# Patient Record
Sex: FEMALE | Race: WHITE
[De-identification: names, ages, dates, MRNs, and addresses within clinical notes are randomized per-mention and may not be internally consistent; named-entity substitution may affect disease eponyms.]

---

## 2018-08-02 ENCOUNTER — HOSPITAL ENCOUNTER (OUTPATIENT)
Dept: HOSPITAL 96 - M.RAD | Age: 73
End: 2018-08-02
Attending: INTERNAL MEDICINE
Payer: COMMERCIAL

## 2018-08-02 DIAGNOSIS — E87.6: ICD-10-CM

## 2018-08-02 DIAGNOSIS — N91.2: ICD-10-CM

## 2018-08-02 DIAGNOSIS — Z78.0: ICD-10-CM

## 2018-08-02 DIAGNOSIS — Z12.31: Primary | ICD-10-CM

## 2018-09-26 ENCOUNTER — HOSPITAL ENCOUNTER (INPATIENT)
Dept: HOSPITAL 96 - M.ERS | Age: 73
LOS: 2 days | Discharge: HOME | DRG: 682 | End: 2018-09-28
Attending: INTERNAL MEDICINE | Admitting: INTERNAL MEDICINE
Payer: COMMERCIAL

## 2018-09-26 VITALS — WEIGHT: 74 LBS | HEIGHT: 64.02 IN | BODY MASS INDEX: 12.64 KG/M2

## 2018-09-26 VITALS — DIASTOLIC BLOOD PRESSURE: 66 MMHG | SYSTOLIC BLOOD PRESSURE: 115 MMHG

## 2018-09-26 VITALS — SYSTOLIC BLOOD PRESSURE: 118 MMHG | DIASTOLIC BLOOD PRESSURE: 72 MMHG

## 2018-09-26 VITALS — DIASTOLIC BLOOD PRESSURE: 72 MMHG | SYSTOLIC BLOOD PRESSURE: 118 MMHG

## 2018-09-26 VITALS — DIASTOLIC BLOOD PRESSURE: 47 MMHG | SYSTOLIC BLOOD PRESSURE: 101 MMHG

## 2018-09-26 VITALS — DIASTOLIC BLOOD PRESSURE: 65 MMHG | SYSTOLIC BLOOD PRESSURE: 104 MMHG

## 2018-09-26 DIAGNOSIS — Z80.3: ICD-10-CM

## 2018-09-26 DIAGNOSIS — E43: ICD-10-CM

## 2018-09-26 DIAGNOSIS — D50.9: ICD-10-CM

## 2018-09-26 DIAGNOSIS — D62: ICD-10-CM

## 2018-09-26 DIAGNOSIS — E87.1: ICD-10-CM

## 2018-09-26 DIAGNOSIS — N18.4: ICD-10-CM

## 2018-09-26 DIAGNOSIS — E87.6: ICD-10-CM

## 2018-09-26 DIAGNOSIS — N17.9: Primary | ICD-10-CM

## 2018-09-26 LAB
ABSOLUTE MONOCYTES: 0.5 THOU/UL (ref 0–1.2)
ALBUMIN SERPL-MCNC: 3.3 G/DL (ref 3.4–5)
ALP SERPL-CCNC: 88 U/L (ref 46–116)
ALT SERPL-CCNC: 18 U/L (ref 30–65)
ANION GAP SERPL CALC-SCNC: 1 MMOL/L (ref 7–16)
ANISOCYTOSIS BLD QL SMEAR: (no result)
APTT BLD: 24.5 SECONDS (ref 25–31.3)
AST SERPL-CCNC: 24 U/L (ref 15–37)
BILIRUB SERPL-MCNC: 0.3 MG/DL
BUN SERPL-MCNC: 39 MG/DL (ref 7–18)
CALCIUM SERPL-MCNC: 9.6 MG/DL (ref 8.5–10.1)
CHLORIDE SERPL-SCNC: 86 MMOL/L (ref 98–107)
CO2 SERPL-SCNC: 45 MMOL/L (ref 21–32)
CREAT SERPL-MCNC: 2.5 MG/DL (ref 0.6–1.3)
GLUCOSE SERPL-MCNC: 115 MG/DL (ref 70–99)
GRANULOCYTES NFR BLD MANUAL: 83 %
HCT VFR BLD CALC: 25.4 % (ref 37–47)
HGB BLD-MCNC: 7.9 GM/DL (ref 12–15)
INR PPP: 1
IRON SERPL-MCNC: 10 UG/DL (ref 50–175)
LYMPHOCYTES # BLD: 0.9 THOU/UL (ref 0.8–5.3)
LYMPHOCYTES NFR BLD AUTO: 11 %
MCH RBC QN AUTO: 23 PG (ref 26–34)
MCHC RBC AUTO-ENTMCNC: 31.2 G/DL (ref 28–37)
MCV RBC: 73.5 FL (ref 80–100)
MICROCYTES: (no result)
MONOCYTES NFR BLD: 6 %
MPV: 6.5 FL. (ref 7.2–11.1)
NEUTROPHILS # BLD: 7.1 THOU/UL (ref 1.6–8.1)
NT-PRO BRAIN NAT PEPTIDE: 281 PG/ML (ref ?–300)
NUCLEATED RBCS: 0 /100WBC
PLATELET # BLD EST: (no result) 10*3/UL
PLATELET COUNT*: 497 THOU/UL (ref 150–400)
POTASSIUM SERPL-SCNC: 2.5 MMOL/L (ref 3.5–5.1)
PROT SERPL-MCNC: 7.2 G/DL (ref 6.4–8.2)
PROTHROMBIN TIME: 9.9 SECONDS (ref 9.2–11.5)
RBC # BLD AUTO: 3.45 MIL/UL (ref 4.2–5)
RDW-CV: 16.4 % (ref 10.5–14.5)
SAO2 % BLD FROM PO2: 2 % (ref 20–39)
SODIUM SERPL-SCNC: 132 MMOL/L (ref 136–145)
TROPONIN-I LEVEL: <0.06 NG/ML (ref ?–0.06)
WBC # BLD AUTO: 8.5 THOU/UL (ref 4–11)

## 2018-09-27 VITALS — SYSTOLIC BLOOD PRESSURE: 115 MMHG | DIASTOLIC BLOOD PRESSURE: 65 MMHG

## 2018-09-27 VITALS — DIASTOLIC BLOOD PRESSURE: 63 MMHG | SYSTOLIC BLOOD PRESSURE: 91 MMHG

## 2018-09-27 VITALS — DIASTOLIC BLOOD PRESSURE: 65 MMHG | SYSTOLIC BLOOD PRESSURE: 115 MMHG

## 2018-09-27 VITALS — SYSTOLIC BLOOD PRESSURE: 98 MMHG | DIASTOLIC BLOOD PRESSURE: 48 MMHG

## 2018-09-27 VITALS — SYSTOLIC BLOOD PRESSURE: 118 MMHG | DIASTOLIC BLOOD PRESSURE: 68 MMHG

## 2018-09-27 VITALS — SYSTOLIC BLOOD PRESSURE: 114 MMHG | DIASTOLIC BLOOD PRESSURE: 71 MMHG

## 2018-09-27 VITALS — DIASTOLIC BLOOD PRESSURE: 50 MMHG | SYSTOLIC BLOOD PRESSURE: 92 MMHG

## 2018-09-27 LAB
ABSOLUTE BASOPHILS: 0 THOU/UL (ref 0–0.2)
ABSOLUTE EOSINOPHILS: 0.1 THOU/UL (ref 0–0.7)
ABSOLUTE MONOCYTES: 0.6 THOU/UL (ref 0–1.2)
ANION GAP SERPL CALC-SCNC: 2 MMOL/L (ref 7–16)
BASOPHILS NFR BLD AUTO: 0.9 %
BILIRUB UR-MCNC: NEGATIVE MG/DL
BUN SERPL-MCNC: 31 MG/DL (ref 7–18)
CALCIUM SERPL-MCNC: 9 MG/DL (ref 8.5–10.1)
CHLORIDE SERPL-SCNC: 96 MMOL/L (ref 98–107)
CO2 SERPL-SCNC: 39 MMOL/L (ref 21–32)
COLOR UR: YELLOW
CREAT SERPL-MCNC: 2 MG/DL (ref 0.6–1.3)
EOSINOPHIL NFR BLD: 1.3 %
GLUCOSE SERPL-MCNC: 95 MG/DL (ref 70–99)
GRANULOCYTES NFR BLD MANUAL: 65.4 %
HCT VFR BLD CALC: 20.8 % (ref 37–47)
HGB BLD-MCNC: 6.5 GM/DL (ref 12–15)
KETONES UR STRIP-MCNC: NEGATIVE MG/DL
LYMPHOCYTES # BLD: 1.1 THOU/UL (ref 0.8–5.3)
LYMPHOCYTES NFR BLD AUTO: 20.9 %
MCH RBC QN AUTO: 22.9 PG (ref 26–34)
MCHC RBC AUTO-ENTMCNC: 31 G/DL (ref 28–37)
MCV RBC: 73.8 FL (ref 80–100)
MONOCYTES NFR BLD: 11.5 %
MPV: 6.6 FL. (ref 7.2–11.1)
NEUTROPHILS # BLD: 3.5 THOU/UL (ref 1.6–8.1)
NUCLEATED RBCS: 0 /100WBC
PLATELET COUNT*: 400 THOU/UL (ref 150–400)
POTASSIUM SERPL-SCNC: 3.6 MMOL/L (ref 3.5–5.1)
PROT UR QL STRIP: NEGATIVE
RBC # BLD AUTO: 2.82 MIL/UL (ref 4.2–5)
RBC # UR STRIP: NEGATIVE /UL
RDW-CV: 16.2 % (ref 10.5–14.5)
SODIUM SERPL-SCNC: 137 MMOL/L (ref 136–145)
SP GR UR STRIP: 1.01 (ref 1–1.03)
URINE CLARITY: CLEAR
URINE GLUCOSE-RANDOM: NEGATIVE
URINE LEUKOCYTES-REFLEX: NEGATIVE
URINE NITRITE-REFLEX: NEGATIVE
UROBILINOGEN UR STRIP-ACNC: 0.2 E.U./DL (ref 0.2–1)
WBC # BLD AUTO: 5.3 THOU/UL (ref 4–11)

## 2018-09-27 PROCEDURE — 30233N1 TRANSFUSION OF NONAUTOLOGOUS RED BLOOD CELLS INTO PERIPHERAL VEIN, PERCUTANEOUS APPROACH: ICD-10-PCS | Performed by: INTERNAL MEDICINE

## 2018-09-27 NOTE — NUR
SPOKE WITH PT. SHE WAS ALERT AND ORIENTED. SHE SAID SHE LIVES HOME ALONE. HER
FAMILY IS TRYING TO GET HER HOUSE READY TO SELL. SHE CAN'T LIVE BY HERSELF
ANYMORE. SHE IS HAVING A LOT OF FALLS AND LEGS GIVE OUT. SHE WANTS TO FIND AN
ASSISTED LIVING FACILITY. SHE IS SERIOUSLY LOOKING AT THE [facility]. SHE HAS A
VERYY SUPPORTIVE FAMILY. HER GRANDAUGHTER CAME IN WHILE I WAS THERE.SHE HAS A
CANE AND WALKER AT HOME. SHE AHS [employer] PAY TO HAVE HER HOUSE CLEANED AND LAWN
MOWED. SHE SAID SHE HAS WONDERFUL NEIGHBORS. PT.HAS TO ROLL AROUND ON A STOOL
TO  COOK IN THE KITCHEN. DISCUSSED MEALS ON WHEELS. SHE SAID SHE NEVER HAS HAD
THEM BEFORE. CM WILL FOLLOW FOR DISCHARGE NEEDS.

## 2018-09-27 NOTE — NUR
RECEIVED REPORT FROM ED NURSE. PT TRANSFERRED TO  227. PT A&OX4. VSS.
ADMISSION HISTORY AND PHYSICAL ASSESSMENT COMPLETED AND CHARTED. PT ON RA WITH
98% O2 SAT. PT TRACING SR ON TELE. PT UP STANDBY ASSIST TO TOILET.  DENIES ANY
PAIN OR DISCOMFORT.  ORIENTED TO ROOM AND CALL LIGHT. PT FOR CT OF THE ABDOMEN
WITH ORAL CONTRAST THIS AM. HOURLY ROUNDING OBSERVED. CALL LIGHT WITHIN REACH.
BED IN LOW POSITION. BED ALARM ON.

## 2018-09-27 NOTE — EKG
[place] [address]
Phone:  [phone]                     ELECTROCARDIOGRAM REPORT      
_______________________________________________________________________________
 
Name:       FLOYD SEN              Room:           [place] [address]    ADM IN  
.R.#:  [id number]      Account #:      [id number]  
Admission:  18     Attend Phys:    Ike Isaac MD 
Discharge:               Date of Birth:  [birth year]  
         Report #: [id number]
    [id number]
_______________________________________________________________________________
THIS REPORT FOR:  //name//                      
 
                         [facility] ED
                                       
Test Date:    2018               Test Time:    17:30:34
Pat Name:     FLOYD SEN          Department:   
Patient ID:   [id number]            Room:         [facility]
Gender:       F                        Technician:   AMALIA
:          1945               Requested By: Ok Britt
Order Number: [id number] MD:   Erick Posey
                                 Measurements
Intervals                              Axis          
Rate:         92                       P:            92
OK:           182                      QRS:          -26
QRSD:         103                      T:            90
QT:           378                                    
QTc:          468                                    
                           Interpretive Statements
Sinus rhythm
Short run of svt
Borderline left axis deviation
Anteroseptal infarct, age indeterminate
Compared to ECG 05/15/2017 18:24:55
Myocardial infarct finding now present
Left anterior fascicular block no longer present
 
Electronically Signed On 2018 16:29:56 CDT by Erick Posey
[email or web address]
 
 
 
 
 
 
 
 
 
 
 
 
 
 
 
  <ELECTRONICALLY SIGNED>
                                           By: Erick Posey MD, FACC     
  18     1629
D: 18 1730   _____________________________________
T: 18 1730   Erick Posey MD, FAC       /EPI Vitamin D, Ergocalciferol, 46747 UNITS capsule   Take 1 capsule by mouth once a week. For 6 months     clonazePAM (KLONOPIN) 1 MG tablet   Take 1-2 tablets by mouth nightly as needed for Anxiety.     Patient states that pharmacy told her that they did not refill above medications because patient does not see Andreina. Patient states she will need refills.

## 2018-09-28 VITALS — SYSTOLIC BLOOD PRESSURE: 96 MMHG | DIASTOLIC BLOOD PRESSURE: 57 MMHG

## 2018-09-28 VITALS — DIASTOLIC BLOOD PRESSURE: 62 MMHG | SYSTOLIC BLOOD PRESSURE: 100 MMHG

## 2018-09-28 VITALS — SYSTOLIC BLOOD PRESSURE: 91 MMHG | DIASTOLIC BLOOD PRESSURE: 46 MMHG

## 2018-09-28 LAB
ABSOLUTE BASOPHILS: 0 THOU/UL (ref 0–0.2)
ABSOLUTE EOSINOPHILS: 0.1 THOU/UL (ref 0–0.7)
ABSOLUTE MONOCYTES: 0.7 THOU/UL (ref 0–1.2)
ANION GAP SERPL CALC-SCNC: 3 MMOL/L (ref 7–16)
BASOPHILS NFR BLD AUTO: 0.6 %
BUN SERPL-MCNC: 26 MG/DL (ref 7–18)
CALCIUM SERPL-MCNC: 8.7 MG/DL (ref 8.5–10.1)
CHLORIDE SERPL-SCNC: 96 MMOL/L (ref 98–107)
CO2 SERPL-SCNC: 38 MMOL/L (ref 21–32)
CREAT SERPL-MCNC: 1.7 MG/DL (ref 0.6–1.3)
EOSINOPHIL NFR BLD: 0.8 %
GLUCOSE SERPL-MCNC: 101 MG/DL (ref 70–99)
GRANULOCYTES NFR BLD MANUAL: 73.3 %
HCT VFR BLD CALC: 27.6 % (ref 37–47)
HGB BLD-MCNC: 8.8 GM/DL (ref 12–15)
LYMPHOCYTES # BLD: 1 THOU/UL (ref 0.8–5.3)
LYMPHOCYTES NFR BLD AUTO: 14.7 %
MCH RBC QN AUTO: 24.4 PG (ref 26–34)
MCHC RBC AUTO-ENTMCNC: 32 G/DL (ref 28–37)
MCV RBC: 76.2 FL (ref 80–100)
MONOCYTES NFR BLD: 10.6 %
MPV: 6.5 FL. (ref 7.2–11.1)
NEUTROPHILS # BLD: 5.1 THOU/UL (ref 1.6–8.1)
NUCLEATED RBCS: 0 /100WBC
PLATELET COUNT*: 402 THOU/UL (ref 150–400)
POTASSIUM SERPL-SCNC: 2.9 MMOL/L (ref 3.5–5.1)
RBC # BLD AUTO: 3.62 MIL/UL (ref 4.2–5)
RDW-CV: 17.4 % (ref 10.5–14.5)
SODIUM SERPL-SCNC: 137 MMOL/L (ref 136–145)
WBC # BLD AUTO: 6.9 THOU/UL (ref 4–11)

## 2018-09-28 NOTE — NUR
CONTINUE TO FOLLOW, PT HAS DC ORDERS.  MET WITH PT, SHE DECLINES HH.  STATES
SHE DOESN'T WANT ANY 'HOME VISITS.'  PLANS TO RETURN HOME.  STATES THEY ARE IN
THE PROCESS OF CLEANING OUT HER HOME AND SELLING IT SO SHE CAN MOVE.

## 2018-09-28 NOTE — NUR
ASSUMED CARE OF PT AT 0740. PT REMAINS A&O X4 CALM AND COOPERATIVE. VSS AND
TRACING SR ON THE MONITOR. PT HAS HAD NO C/O PAIN OR DISTRESS TODAY AND IS
BEING DISCHARGED HOME AT THIS TIME. PT AND DAUGHTER VERBALIZED UNDERSTANDING
OF DC INSTRUCTIONS. ALL PERSONAL BELONGINGS AND DISCHARGE PAPERWORK TAKEN BY
PT AT DISCHARGE.

## 2018-09-28 NOTE — NUR
ASSUMED CARE OF PT AFTER REPORT AT 1930. PT A&OX4. VSS. PHYSICAL ASSESSMENT
COMPLETED AND CHARTED. PT ON RA WITH 99% O2 SAT. PT TRACING SR ON TELE. PT UP
STANDBY ASSIST TO TOILET. PT REFUSED LOVENOX. THIS NURSE EXPLAINED THE
INDICATION FOR LOVENOX. COMMUNICATES UNDERSTANDING BUT STILL REFUSES. PT
STATED HER THROAT HURTS DUE TO COUGHING. SHE'S BEEN COUGHING SINCE SHE HAD HER
CT WITH ORAL CONTRAST. PT REQUESTED FOR COUGH PILL-PROVIDER INFORMED. HOURLY
ROUNDING OBSERVED. CALL LIGHT WITHIN REACH. BED IN LOW POSITION. BED ALARM ON.

## 2018-10-08 NOTE — CON
[address]  [address]                    CONSULTATION                  
_______________________________________________________________________________
 
Name:       FLOYD SEN              Room:           227-1    [facility] [place]  
M.R.#:  [id number]      Account #:      [id number]  
Admission:  09/26/18     Attend Phys:    Ike Isaac MD 
Discharge:  09/28/18     Date of Birth:  [birth year]  
         Report #: [id number]
                                                                     [id number]  
_______________________________________________________________________________
THIS REPORT FOR:  //name//                      
 
CC: Ike Madrigal
 
DATE OF SERVICE:  09/27/2018
 
 
ROOM NUMBER:  227.
 
HISTORY OF PRESENT ILLNESS:  This is a 73-year-old female patient who is not a
very good historian.  This patient has multiple problems over a period of time. 
She also try to manage many of those problems by herself by taking some dietary
supplements and herbs as she has taken that for a long time.  She has not taken
the treatment, which was prescribed to her for her anemia.  She indicates that
she has unintentional weight loss, her appetite is poor and she has been falling
down.  Her falling down is because of weakness as well as she indicates she has
episode where she will have sudden loss of strength in her lower extremities. 
She describes something like a drop attack.  How often that it happened is not
clear.  She has not hurt herself with this.  She does not know whether she gets
an opportunity to sit down or break the fall.  History is just very vague in
that regard.  She is losing weight.  She is anemic and when she came in, she was
also hypokalemic.  This is also associated with pretty significantly impaired
dietary intake.  She had those trauma, which started it and all of it started
spontaneously.
 
REVIEW OF SYSTEMS:  Positive for anemia, hypokalemia, chronic renal disease,
weakness in all 4 extremities.  Prior history of anemia from which she took
shot, prior history of kidney failure for which I do not think she had followup.
 Even before, she has been admitted with hypokalemia.  She did have a TSH and
vitamin B12 during this admission and that was unremarkable.  She denies any
associated stroke.  At one time, she had a kidney tumor removed.  This was her
relevant 14-point review of systems.  She denies any new eye, ENT, cardiac,
respiratory, , musculoskeletal, constitutional, dermatological, hematological,
psychiatric, throat, allergic symptom associated with present symptomatology
except as described above.
 
PAST MEDICAL HISTORY:  Positive for anemia as well as kidney failure.
 
FAMILY HISTORY:  Negative for any early age stroke.
 
SOCIAL HISTORY:  This patient does not smoke or drink alcohol.
 
PHYSICAL EXAMINATION:  Indicates she is alert, responsive, oriented.  Her
speech, concentration, fund of knowledge and memory is a reasonably preserved. 
Cranial nerve examination 2-12 looks unremarkable.  She is weak in all 4
 
 
 
[place] [address]                    CONSULTATION                  
_______________________________________________________________________________
 
Name:       FLOYD SEN              Room:           [address] [place]  
..#:  [id number]      Account #:      [id number]  
Admission:  09/26/18     Attend Phys:    Ike Isaac MD 
Discharge:  09/28/18     Date of Birth:  [birth year]  
         Report #: [id number]
                                                                     [id number]  
_______________________________________________________________________________
extremities.  She has generalized weakness.  Her muscle and in general, she
appears emaciated.  I could not elicit the reflexes in the lower extremities. 
Her position sense is intact.  Her tone is unremarkable.  Finger-to-nose is
unremarkable.  I could not look at the fundus.
 
She is very thinly built individual whose vision and hearing look adequate.  Her
pulses are palpable in the lower extremities.  She has no edema, cyanosis or
jaundice.  Cardiac examination is unremarkable.  No respiratory difficulty or
rhonchi.  No thyroid mass or carotid bruit, no meningeal sign or facial
dysmorphic feature.  Her blood pressure is 98/48, respiration is 17, pulse is
72, temperature is 98.4.
 
LABORATORY DATA:  Her hemoglobin is only 6.5 and when she came in, her potassium
was 2.5.  Her GFR is 24 now, it was 19 when she came.  She did have a CT scan of
the head on admission, which shows generalized atrophy.
 
IMPRESSION:  This patient's history is extremely poor and a definite diagnosis
is not possible.  I suspect many of the patient's problem is because of
inadequate p.o. intake.  This episode she is having where she falls is not
clear.  I suspect they may also be related to her metabolic problems.  However,
the way she describes, we will rule out the possibility of posterior fossa
transient ischemic attack, especially we will see if there is any stenosis
present, which become symptomatic with either hypotension or her anemia or
electrolyte imbalances.  If that is okay, the main evaluation and management is
going to be management of the systemic problem.
 
RECOMMENDATIONS:
1.  I will go ahead and do the MRI and check on that.
2.  If that shows some abnormality, I will follow up.  Otherwise, I will suggest
she continue to work up the systemic problem and managing that.  I discussed all
of it with the patient and the family and they are agreeable with this plan.
 
Thank you very much for this referral and if you have any question, please feel
free to contact me.
 
 
 
 
 
 
 
 
 
 
<ELECTRONICALLY SIGNED>
                                        By:  Mario Ramirez MD             
10/08/18     0950
D: 09/27/18 1040_______________________________________
T: 09/27/18 1322Pemre Ramirez MD                /nt

## 2018-10-15 NOTE — CON
[address]  [address]                    CONSULTATION                  
_______________________________________________________________________________
 
Name:       FLOYD SEN KORIN              Room:           [address] [place]  
M.R.#:  [id number]      Account #:      [id number]  
Admission:  09/26/18     Attend Phys:    Ike Isaac MD 
Discharge:  09/28/18     Date of Birth:  [birth year]  
         Report #: [id number]
                                                                     [id number]  
_______________________________________________________________________________
THIS REPORT FOR:  //name//                      
 
CC: Ike Madrigal
 
HISTORY OF PRESENT ILLNESS:  This is a 73-year-old female with past medical
history significant for anemia and chronic kidney disease, who was referred to
the hospital by her primary care practitioner because of weakness and weight
loss.  The patient reports that at baseline, she weighs around 100 pounds and
over the last couple of months, has lost another 10 pounds.  The patient reports
loss of appetite and decreased p.o. intake.  She denies any abdominal pain,
nausea, vomiting, diarrhea, hematochezia, or hematemesis.  The patient reports
that she was diagnosed with anemia of chronic disease in the past and was placed
on erythropoietin injections, but she stopped taking erythropoietin injections
and instead began taking herbal and vitamin supplementation.  She denies ever
having an EGD or colonoscopy in the past, but reports that she had stool testing
done for colon cancer screening.
 
PAST MEDICAL HISTORY:  As mentioned above.  She has history of chronic renal
disease and anemia.
 
PAST SURGICAL HISTORY:  The patient has history of tonsillectomy and
hysterectomy.
 
SOCIAL HISTORY:  The patient denies alcohol, smoking or recreational drug use.
 
FAMILY HISTORY:  Mother and aunt were both diagnosed with breast cancer.  The
patient herself has never been diagnosed with breast cancer.
 
REVIEW OF SYSTEMS:  A comprehensive 10-point review of systems is negative
except for what is mentioned above.
 
PHYSICAL EXAMINATION:
VITAL SIGNS:  Temperature 37.1, pulse rate 78, blood pressure, blood pressure
115/65, respirations 18, pulse ox 100% on room air.
GENERAL:  The patient is alert, awake, oriented times 3.  Mucous membranes are
moist.
HEENT:  There is no congestion.  There is conjunctival pallor noted.
NECK:  Supple.  There is no supraclavicular lymphadenopathy.
CARDIOVASCULAR:  Rate and rhythm regular, S1, S2 present.
LUNGS:  Clear to auscultation bilaterally.
ABDOMEN:  Soft.  There is no distention, no tenderness.  No guarding or
rigidity.
EXTREMITIES:  Warm, well perfused.  There is no edema.
NEUROLOGIC:  There is no focal neurological deficit.
SKIN:  Warm and dry.
 
 
 
[place] [address]                    CONSULTATION                  
_______________________________________________________________________________
 
Name:       FLOYD SEN              Room:           M.227-1    [facility] [place]  
M.R.#:  [id number]      Account #:      [id number]  
Admission:  09/26/18     Attend Phys:    Ike Isaac MD 
Discharge:  09/28/18     Date of Birth:  [birth year]  
         Report #: [id number]
                                                                     [id number]  
_______________________________________________________________________________
 
LABORATORY DATA:  Hemoglobin 6.5, hematocrit 20.8, MCV 73.8, sodium 137,
potassium 3.6, chloride 96, bicarbonate 39, BUN 31, creatinine 2, iron
saturation 2%, TIBC 424, ferritin 14, total bilirubin 0.3, AST 24, ALT 18,
alkaline phosphatase 88.  INR 1, no acute intra-abdominal pelvic process seen. 
Small hiatal hernia.  Significant fluid filled gastric distention.  Punctate
calcifications in both kidneys consistent with nonobstructing bilateral renal
calculi, anterolateral bladder diverticulum and significant history of scoliosis
of the thoracolumbar spine.
 
ASSESSMENT AND PLAN:  This is a very pleasant 33-year-old female with past
medical history significant for chronic kidney disease who is presenting with
anemia, progressive weight loss of 10 pounds over her baseline weight of 100
pounds.  The patient never had an esophagogastroduodenoscopy or colonoscopy
before.  I strongly recommended esophagogastroduodenoscopy and colonoscopy for
the iron deficiency anemia and the weight loss, but the patient strongly denied
both procedures at this time and said would reconsider if she changed her mind. 
I am going to order a tissue transglutaminase to see if she has celiac disease
otherwise.
 
 
 
 
 
 
 
 
 
 
 
 
 
 
 
 
 
 
 
 
 
 
 
 
 
<ELECTRONICALLY SIGNED>
                                        By:  Julius Dahl MD         
10/15/18     1123
D: 09/27/18 1654_______________________________________
T: 09/28/18 0013Julius Dahl MD            /nt

## 2018-11-08 ENCOUNTER — HOSPITAL ENCOUNTER (OUTPATIENT)
Dept: HOSPITAL 96 - M.SUR | Age: 73
Discharge: HOME | End: 2018-11-08
Attending: INTERNAL MEDICINE
Payer: COMMERCIAL

## 2018-11-08 DIAGNOSIS — Z98.890: ICD-10-CM

## 2018-11-08 DIAGNOSIS — M10.9: ICD-10-CM

## 2018-11-08 DIAGNOSIS — Z53.8: ICD-10-CM

## 2018-11-08 DIAGNOSIS — N18.4: ICD-10-CM

## 2018-11-08 DIAGNOSIS — Z79.899: ICD-10-CM

## 2018-11-08 DIAGNOSIS — Z90.710: ICD-10-CM

## 2018-11-08 DIAGNOSIS — M19.90: ICD-10-CM

## 2018-11-08 DIAGNOSIS — M81.0: ICD-10-CM

## 2018-11-08 DIAGNOSIS — K21.0: Primary | ICD-10-CM

## 2018-11-08 LAB
ALBUMIN SERPL-MCNC: 3 G/DL (ref 3.4–5)
ALP SERPL-CCNC: 89 U/L (ref 46–116)
ALT SERPL-CCNC: 17 U/L (ref 30–65)
ANION GAP SERPL CALC-SCNC: 4 MMOL/L (ref 7–16)
AST SERPL-CCNC: 22 U/L (ref 15–37)
BILIRUB SERPL-MCNC: 0.5 MG/DL
BUN SERPL-MCNC: 26 MG/DL (ref 7–18)
CALCIUM SERPL-MCNC: 9 MG/DL (ref 8.5–10.1)
CHLORIDE SERPL-SCNC: 96 MMOL/L (ref 98–107)
CO2 SERPL-SCNC: 40 MMOL/L (ref 21–32)
CREAT SERPL-MCNC: 1.6 MG/DL (ref 0.6–1.3)
GLUCOSE SERPL-MCNC: 93 MG/DL (ref 70–99)
HCT VFR BLD CALC: 28.4 % (ref 37–47)
HGB BLD-MCNC: 9.1 GM/DL (ref 12–15)
MCH RBC QN AUTO: 25.8 PG (ref 26–34)
MCHC RBC AUTO-ENTMCNC: 32.1 G/DL (ref 28–37)
MCV RBC: 80.6 FL (ref 80–100)
MPV: 6.8 FL. (ref 7.2–11.1)
PLATELET COUNT*: 404 THOU/UL (ref 150–400)
POTASSIUM SERPL-SCNC: 3 MMOL/L (ref 3.5–5.1)
PROT SERPL-MCNC: 6.6 G/DL (ref 6.4–8.2)
RBC # BLD AUTO: 3.52 MIL/UL (ref 4.2–5)
RDW-CV: 23 % (ref 10.5–14.5)
SODIUM SERPL-SCNC: 140 MMOL/L (ref 136–145)
WBC # BLD AUTO: 6.2 THOU/UL (ref 4–11)

## 2019-04-03 ENCOUNTER — HOSPITAL ENCOUNTER (INPATIENT)
Dept: HOSPITAL 96 - M.ERS | Age: 74
LOS: 5 days | Discharge: HOME HEALTH | DRG: 872 | End: 2019-04-08
Attending: INTERNAL MEDICINE | Admitting: INTERNAL MEDICINE
Payer: COMMERCIAL

## 2019-04-03 VITALS — DIASTOLIC BLOOD PRESSURE: 45 MMHG | SYSTOLIC BLOOD PRESSURE: 104 MMHG

## 2019-04-03 VITALS — DIASTOLIC BLOOD PRESSURE: 46 MMHG | SYSTOLIC BLOOD PRESSURE: 104 MMHG

## 2019-04-03 VITALS
BODY MASS INDEX: 18.76 KG/M2 | WEIGHT: 109.9 LBS | DIASTOLIC BLOOD PRESSURE: 67 MMHG | SYSTOLIC BLOOD PRESSURE: 124 MMHG | HEIGHT: 64.02 IN

## 2019-04-03 VITALS — SYSTOLIC BLOOD PRESSURE: 119 MMHG | DIASTOLIC BLOOD PRESSURE: 59 MMHG

## 2019-04-03 DIAGNOSIS — N30.90: ICD-10-CM

## 2019-04-03 DIAGNOSIS — D63.8: ICD-10-CM

## 2019-04-03 DIAGNOSIS — R31.9: ICD-10-CM

## 2019-04-03 DIAGNOSIS — K21.9: ICD-10-CM

## 2019-04-03 DIAGNOSIS — E86.9: ICD-10-CM

## 2019-04-03 DIAGNOSIS — M19.90: ICD-10-CM

## 2019-04-03 DIAGNOSIS — N18.4: ICD-10-CM
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
## 2019-04-03 DIAGNOSIS — A41.9: Primary | ICD-10-CM

## 2019-04-03 DIAGNOSIS — E44.0: ICD-10-CM

## 2019-04-03 DIAGNOSIS — E87.6: ICD-10-CM

## 2019-04-03 DIAGNOSIS — B96.20: ICD-10-CM

## 2019-04-03 DIAGNOSIS — Z96.651: ICD-10-CM

## 2019-04-03 DIAGNOSIS — Z79.899: ICD-10-CM
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
## 2019-04-03 LAB
ABSOLUTE BASOPHILS: 0.2 THOU/UL (ref 0–0.2)
ABSOLUTE MONOCYTES: 1.3 THOU/UL (ref 0–1.2)
ALBUMIN SERPL-MCNC: 2.8 G/DL (ref 3.4–5)
ALP SERPL-CCNC: 498 U/L (ref 46–116)
ALT SERPL-CCNC: 86 U/L (ref 30–65)
ANION GAP SERPL CALC-SCNC: 12 MMOL/L (ref 7–16)
AST SERPL-CCNC: 111 U/L (ref 15–37)
BACTERIA-REFLEX: (no result) /HPF
BASOPHILS NFR BLD AUTO: 1 %
BILIRUB SERPL-MCNC: 0.8 MG/DL
BILIRUB UR-MCNC: NEGATIVE MG/DL
BUN SERPL-MCNC: 52 MG/DL (ref 7–18)
CALCIUM SERPL-MCNC: 8.7 MG/DL (ref 8.5–10.1)
CHLORIDE SERPL-SCNC: 99 MMOL/L (ref 98–107)
CO2 SERPL-SCNC: 24 MMOL/L (ref 21–32)
COLOR UR: YELLOW
CREAT SERPL-MCNC: 2 MG/DL (ref 0.6–1.3)
GLUCOSE SERPL-MCNC: 123 MG/DL (ref 70–99)
GRANULOCYTES NFR BLD MANUAL: 87 %
HCT VFR BLD CALC: 27.9 % (ref 37–47)
HGB BLD-MCNC: 8.8 GM/DL (ref 12–15)
INR PPP: 1
KETONES UR STRIP-MCNC: NEGATIVE MG/DL
LIPASE: 217 U/L (ref 73–393)
LYMPHOCYTES # BLD: 1.3 THOU/UL (ref 0.8–5.3)
LYMPHOCYTES NFR BLD AUTO: 6 %
MCH RBC QN AUTO: 24 PG (ref 26–34)
MCHC RBC AUTO-ENTMCNC: 31.6 G/DL (ref 28–37)
MCV RBC: 76 FL (ref 80–100)
MONOCYTES NFR BLD: 6 %
MPV: 7.6 FL. (ref 7.2–11.1)
MUCUS: (no result) STRN/LPF
NEUTROPHILS # BLD: 18.6 THOU/UL (ref 1.6–8.1)
NT-PRO BRAIN NAT PEPTIDE: 1541 PG/ML (ref ?–300)
NUCLEATED RBCS: 0 /100WBC
PLATELET # BLD EST: ADEQUATE 10*3/UL
PLATELET COUNT*: 317 THOU/UL (ref 150–400)
POTASSIUM SERPL-SCNC: 3.9 MMOL/L (ref 3.5–5.1)
PROT SERPL-MCNC: 7.4 G/DL (ref 6.4–8.2)
PROT UR QL STRIP: (no result)
PROTHROMBIN TIME: 10 SECONDS (ref 9.2–11.5)
RBC # BLD AUTO: 3.67 MIL/UL (ref 4.2–5)
RBC # UR STRIP: (no result) /UL
RBC MORPH BLD: NORMAL
RDW-CV: 18.3 % (ref 10.5–14.5)
SODIUM SERPL-SCNC: 135 MMOL/L (ref 136–145)
SP GR UR STRIP: 1.01 (ref 1–1.03)
SQUAMOUS: (no result) /LPF (ref 0–3)
TROPONIN-I LEVEL: <0.06 NG/ML (ref ?–0.06)
URINE CLARITY: CLEAR
URINE GLUCOSE-RANDOM: NEGATIVE
URINE LEUKOCYTES-REFLEX: (no result)
URINE NITRITE-REFLEX: POSITIVE
URINE WBC-REFLEX: (no result) /HPF (ref 0–5)
UROBILINOGEN UR STRIP-ACNC: 0.2 E.U./DL (ref 0.2–1)
WBC # BLD AUTO: 21.4 THOU/UL (ref 4–11)

## 2019-04-04 VITALS — DIASTOLIC BLOOD PRESSURE: 58 MMHG | SYSTOLIC BLOOD PRESSURE: 102 MMHG

## 2019-04-04 VITALS — SYSTOLIC BLOOD PRESSURE: 104 MMHG | DIASTOLIC BLOOD PRESSURE: 44 MMHG

## 2019-04-04 VITALS — SYSTOLIC BLOOD PRESSURE: 120 MMHG | DIASTOLIC BLOOD PRESSURE: 51 MMHG

## 2019-04-04 VITALS — DIASTOLIC BLOOD PRESSURE: 61 MMHG | SYSTOLIC BLOOD PRESSURE: 120 MMHG

## 2019-04-04 VITALS — SYSTOLIC BLOOD PRESSURE: 137 MMHG | DIASTOLIC BLOOD PRESSURE: 68 MMHG

## 2019-04-04 LAB
ABSOLUTE BASOPHILS: 0 THOU/UL (ref 0–0.2)
ABSOLUTE EOSINOPHILS: 0 THOU/UL (ref 0–0.7)
ABSOLUTE MONOCYTES: 1.9 THOU/UL (ref 0–1.2)
ANION GAP SERPL CALC-SCNC: 10 MMOL/L (ref 7–16)
BASOPHILS NFR BLD AUTO: 0.2 %
BUN SERPL-MCNC: 45 MG/DL (ref 7–18)
CALCIUM SERPL-MCNC: 8.1 MG/DL (ref 8.5–10.1)
CHLORIDE SERPL-SCNC: 107 MMOL/L (ref 98–107)
CO2 SERPL-SCNC: 21 MMOL/L (ref 21–32)
CREAT SERPL-MCNC: 1.8 MG/DL (ref 0.6–1.3)
EOSINOPHIL NFR BLD: 0 %
GLUCOSE SERPL-MCNC: 143 MG/DL (ref 70–99)
GRANULOCYTES NFR BLD MANUAL: 88 %
HCT VFR BLD CALC: 24 % (ref 37–47)
HGB BLD-MCNC: 7.7 GM/DL (ref 12–15)
LYMPHOCYTES # BLD: 0.5 THOU/UL (ref 0.8–5.3)
LYMPHOCYTES NFR BLD AUTO: 2.4 %
MAGNESIUM SERPL-MCNC: 2.2 MG/DL (ref 1.8–2.4)
MCH RBC QN AUTO: 24.2 PG (ref 26–34)
MCHC RBC AUTO-ENTMCNC: 32 G/DL (ref 28–37)
MCV RBC: 75.7 FL (ref 80–100)
MONOCYTES NFR BLD: 9.4 %
MPV: 7.5 FL. (ref 7.2–11.1)
NEUTROPHILS # BLD: 18 THOU/UL (ref 1.6–8.1)
NUCLEATED RBCS: 0 /100WBC
PLATELET COUNT*: 272 THOU/UL (ref 150–400)
POTASSIUM SERPL-SCNC: 4.1 MMOL/L (ref 3.5–5.1)
RBC # BLD AUTO: 3.18 MIL/UL (ref 4.2–5)
RDW-CV: 17.9 % (ref 10.5–14.5)
SODIUM SERPL-SCNC: 138 MMOL/L (ref 136–145)
WBC # BLD AUTO: 20.5 THOU/UL (ref 4–11)

## 2019-04-04 NOTE — NUR
RECEIVED REPORT AND ASSUMED CARE OF PT, ASSESSMENT COMPLETED. PT PLEASANT AND
COOPERATIVE. SNUGGLED DOWN IN BED AND REQUESTING NOT TO BE MOVED BECAUSE SHE
IS COMFORTABLE. TEMPT 101.3, DR ERNANDEZ NOTIFIED AND TYLENOL ORDERED. TELEMETRY
ON SHOWING SR. WILL CONT TO MONITOR AND ASSIST AS NEEDED.

## 2019-04-04 NOTE — NUR
Pt is A&O. Resides at home alone, states that she is planning on moving to [facility] on 4/9. Pt is independent, continues to drive and cooks small
meals. Pt pays someone to clean her home. Pt has a walker and cane that she
uses for mobility. No hx of HH. Hx of skilled at [facility]. Pt
states that she is has a good support sx. Goal is home at [place]. Following.

## 2019-04-04 NOTE — EKG
[place] [address]
Phone:  [phone]                     ELECTROCARDIOGRAM REPORT      
_______________________________________________________________________________
 
Name:       FLOYD SNE              Room:           [address]    ADM IN  
M.R.#:  [id number]      Account #:      [id number]  
Admission:  19     Attend Phys:    Ryan Venegas MD 
Discharge:               Date of Birth:  [birth year]  
         Report #: [id number]
    [id number]
_______________________________________________________________________________
THIS REPORT FOR:  //name//                      
 
                         [facility] ED
                                       
Test Date:    2019               Test Time:    20:28:44
Pat Name:     FLOYD SEN          Department:   
Patient ID:   [id number]            Room:         [facility]
Gender:       F                        Technician:   SHREYAS BRIGGS
:          1945               Requested By: Ok Britt
Order Number: [id number] MD:   Deuce Dick
                                 Measurements
Intervals                              Axis          
Rate:         96                       P:            
AL:                                    QRS:          -37
QRSD:         144                      T:            66
QT:           341                                    
QTc:          431                                    
                           Interpretive Statements
Sinus rhythm
Baseline artifact
Compared to ECG 2018 17:30:34
Significant changes not noted
Electronically Signed On 2019 17:04:38 CDT by Deuce Dick
[email or web address]
 
 
 
 
 
 
 
 
 
 
 
 
 
 
 
 
 
 
 
  <ELECTRONICALLY SIGNED>
                                           By: Deuce Dick MD, [facility]   
  19     1704
D: 19   _____________________________________
T: 19   Deuce Dick MD, [facility]     /EPI

## 2019-04-04 NOTE — NUR
RECEIVED REPORT FROM YECENIA ELIZONDO. PT TRANSFERRED TO  213. PT A&OX4. VSS.
CARDIAC MONITOR IN PLACE. ADMISSION HISTORY AND PHYSICAL ASSESSMENT COMPLETED
AND CHARTED. PT ON RA WITH 95% O2 SAT. PT TRACING SR ON TELE. PT UP WITH 1
ASSIST TO BSC. PT ORIENTED TO ROLOM & CALL LIGHT. PT DENIES ANY PAIN OR
DISCOMFORT. PT RESTED WELL ON BED. CALL LIGHT WITHIN REACH.

## 2019-04-05 VITALS — DIASTOLIC BLOOD PRESSURE: 55 MMHG | SYSTOLIC BLOOD PRESSURE: 111 MMHG

## 2019-04-05 VITALS — DIASTOLIC BLOOD PRESSURE: 67 MMHG | SYSTOLIC BLOOD PRESSURE: 121 MMHG

## 2019-04-05 VITALS — SYSTOLIC BLOOD PRESSURE: 119 MMHG | DIASTOLIC BLOOD PRESSURE: 57 MMHG

## 2019-04-05 VITALS — DIASTOLIC BLOOD PRESSURE: 60 MMHG | SYSTOLIC BLOOD PRESSURE: 95 MMHG

## 2019-04-05 VITALS — SYSTOLIC BLOOD PRESSURE: 112 MMHG | DIASTOLIC BLOOD PRESSURE: 62 MMHG

## 2019-04-05 LAB
ANION GAP SERPL CALC-SCNC: 12 MMOL/L (ref 7–16)
BUN SERPL-MCNC: 34 MG/DL (ref 7–18)
CALCIUM SERPL-MCNC: 7.8 MG/DL (ref 8.5–10.1)
CHLORIDE SERPL-SCNC: 109 MMOL/L (ref 98–107)
CO2 SERPL-SCNC: 19 MMOL/L (ref 21–32)
CREAT SERPL-MCNC: 1.7 MG/DL (ref 0.6–1.3)
GLUCOSE SERPL-MCNC: 101 MG/DL (ref 70–99)
HCT VFR BLD CALC: 22.9 % (ref 37–47)
HGB BLD-MCNC: 7.2 GM/DL (ref 12–15)
MAGNESIUM SERPL-MCNC: 2 MG/DL (ref 1.8–2.4)
MCH RBC QN AUTO: 24 PG (ref 26–34)
MCHC RBC AUTO-ENTMCNC: 31.4 G/DL (ref 28–37)
MCV RBC: 76.2 FL (ref 80–100)
MPV: 7.8 FL. (ref 7.2–11.1)
PLATELET COUNT*: 254 THOU/UL (ref 150–400)
POTASSIUM SERPL-SCNC: 3.5 MMOL/L (ref 3.5–5.1)
RBC # BLD AUTO: 3 MIL/UL (ref 4.2–5)
RDW-CV: 17.9 % (ref 10.5–14.5)
SODIUM SERPL-SCNC: 140 MMOL/L (ref 136–145)
WBC # BLD AUTO: 16.9 THOU/UL (ref 4–11)

## 2019-04-05 NOTE — NUR
SLEPT WELL TONIGHT. UP TO BSC WITH SBA, STEADY. HAVING OCC MOIST COUGH,
NON-PROD. TELEMETRY CONT TO SHOW SR. NO CHANGE IN ASSESSMENT. AFEBRILE DURING
NIGHT AFTER HAVING TEMP 0F 101.3. HS GOALS OF REST AND SAFETY ACHIEVED, HOURLY
ROUNDING OBSERVED.

## 2019-04-05 NOTE — NUR
DISCHARGE PLANNER SPOKE TO THE PATIENT TO DISCUSS DISCHARGE PLANNING AND
SKILLED AT D/C. PATIENT UNWILLING TO DISCUSS STATING 'I'M GOING TO [place], AND I'VE ALREADY HAD MY 3 DAY TRIAL STAY'. D/C PLANNER ATTEMPTED TO
INFORM THE PATIENT THAT AT THIS POINT SHE IS NEEDING A LOT OF ASSISTANCE WITH
MOBILITY AND WILL NEED SKILLED THERAPY PRIOR TO GOING TO [place].
PATIENT STILL UNWILLING TO DISCUSS. D/C PLANNER ATTEMPTED TO CONTACT THE
PATIENT'S DTR TO DISCUSS FURTHER, AND LEFT A MESSAGE FOR HER TO RETURN CALL.
CM WILL REMAIN AVIALABLE TO ASSIST AND FOLLOW AS NEEDED.

## 2019-04-05 NOTE — NUR
ASSUMED PT CARE REPORT RECEIVED FROM NURSE. PT IS AOX4 SR ON CARDIAC MONITOR.
ON RA. VSS. NO FEVER. DENIES PAIN. WORKED WITH PT/OT. OUT OF BED TO CHAIR.
GOOD APPETITE. CALL LIGHT AT REACH

## 2019-04-06 VITALS — SYSTOLIC BLOOD PRESSURE: 125 MMHG | DIASTOLIC BLOOD PRESSURE: 65 MMHG

## 2019-04-06 VITALS — DIASTOLIC BLOOD PRESSURE: 63 MMHG | SYSTOLIC BLOOD PRESSURE: 136 MMHG

## 2019-04-06 VITALS — DIASTOLIC BLOOD PRESSURE: 70 MMHG | SYSTOLIC BLOOD PRESSURE: 138 MMHG

## 2019-04-06 VITALS — SYSTOLIC BLOOD PRESSURE: 138 MMHG | DIASTOLIC BLOOD PRESSURE: 64 MMHG

## 2019-04-06 LAB
ANION GAP SERPL CALC-SCNC: 11 MMOL/L (ref 7–16)
BUN SERPL-MCNC: 34 MG/DL (ref 7–18)
CALCIUM SERPL-MCNC: 8.1 MG/DL (ref 8.5–10.1)
CHLORIDE SERPL-SCNC: 108 MMOL/L (ref 98–107)
CO2 SERPL-SCNC: 20 MMOL/L (ref 21–32)
CREAT SERPL-MCNC: 1.5 MG/DL (ref 0.6–1.3)
GLUCOSE SERPL-MCNC: 104 MG/DL (ref 70–99)
HCT VFR BLD CALC: 24.9 % (ref 37–47)
HGB BLD-MCNC: 7.8 GM/DL (ref 12–15)
MAGNESIUM SERPL-MCNC: 2 MG/DL (ref 1.8–2.4)
MCH RBC QN AUTO: 23.8 PG (ref 26–34)
MCHC RBC AUTO-ENTMCNC: 31.4 G/DL (ref 28–37)
MCV RBC: 75.7 FL (ref 80–100)
MPV: 7.6 FL. (ref 7.2–11.1)
PLATELET COUNT*: 352 THOU/UL (ref 150–400)
POTASSIUM SERPL-SCNC: 3.8 MMOL/L (ref 3.5–5.1)
RBC # BLD AUTO: 3.3 MIL/UL (ref 4.2–5)
RDW-CV: 18.5 % (ref 10.5–14.5)
SODIUM SERPL-SCNC: 139 MMOL/L (ref 136–145)
WBC # BLD AUTO: 17.6 THOU/UL (ref 4–11)

## 2019-04-06 NOTE — NUR
RECIEVED REPORT AND ASSUMED CARE AT 1900. CARDIAC MONITOR IN PLACE. VITAL
SIGNS STABLE. PT UP WITH STANDBY ASSIST TO COMMODE. PT HAS SOME RIGHT KNEE
PAIN AND PAIN MEDS GIVEN AS ORDERED. ASSESSMENT COMPLETED DISCUSSED PLAN OF
CARE AND PT UNDERSTANDS. BED LOCKED, ALARM ON AND CALL LIGHT WITHIN REACH.
FALL PRECAUTIONS IN PLACE. HOURLY ROUNDING DONE AND ALL NEEDS MET. NURSING
WILL CONTINUE TO MONITOR.

## 2019-04-07 VITALS — DIASTOLIC BLOOD PRESSURE: 76 MMHG | SYSTOLIC BLOOD PRESSURE: 115 MMHG

## 2019-04-07 VITALS — SYSTOLIC BLOOD PRESSURE: 141 MMHG | DIASTOLIC BLOOD PRESSURE: 74 MMHG

## 2019-04-07 VITALS — SYSTOLIC BLOOD PRESSURE: 115 MMHG | DIASTOLIC BLOOD PRESSURE: 66 MMHG

## 2019-04-07 VITALS — SYSTOLIC BLOOD PRESSURE: 147 MMHG | DIASTOLIC BLOOD PRESSURE: 72 MMHG

## 2019-04-07 VITALS — DIASTOLIC BLOOD PRESSURE: 60 MMHG | SYSTOLIC BLOOD PRESSURE: 153 MMHG

## 2019-04-07 LAB
ANION GAP SERPL CALC-SCNC: 12 MMOL/L (ref 7–16)
BUN SERPL-MCNC: 27 MG/DL (ref 7–18)
CALCIUM SERPL-MCNC: 8 MG/DL (ref 8.5–10.1)
CHLORIDE SERPL-SCNC: 109 MMOL/L (ref 98–107)
CO2 SERPL-SCNC: 20 MMOL/L (ref 21–32)
CREAT SERPL-MCNC: 1.4 MG/DL (ref 0.6–1.3)
GLUCOSE SERPL-MCNC: 104 MG/DL (ref 70–99)
HCT VFR BLD CALC: 21.7 % (ref 37–47)
HCT VFR BLD CALC: 28.9 % (ref 37–47)
HGB BLD-MCNC: 6.9 GM/DL (ref 12–15)
HGB BLD-MCNC: 9.2 GM/DL (ref 12–15)
IRON SERPL-MCNC: 9 UG/DL (ref 50–175)
MAGNESIUM SERPL-MCNC: 1.8 MG/DL (ref 1.8–2.4)
MCH RBC QN AUTO: 23.5 PG (ref 26–34)
MCHC RBC AUTO-ENTMCNC: 31.6 G/DL (ref 28–37)
MCV RBC: 74.4 FL (ref 80–100)
MPV: 7.6 FL. (ref 7.2–11.1)
PLATELET COUNT*: 331 THOU/UL (ref 150–400)
POTASSIUM SERPL-SCNC: 3.4 MMOL/L (ref 3.5–5.1)
RBC # BLD AUTO: 2.92 MIL/UL (ref 4.2–5)
RDW-CV: 18.3 % (ref 10.5–14.5)
SAO2 % BLD FROM PO2: 4 % (ref 20–39)
SODIUM SERPL-SCNC: 141 MMOL/L (ref 136–145)
WBC # BLD AUTO: 17.4 THOU/UL (ref 4–11)

## 2019-04-07 PROCEDURE — 30233N1 TRANSFUSION OF NONAUTOLOGOUS RED BLOOD CELLS INTO PERIPHERAL VEIN, PERCUTANEOUS APPROACH: ICD-10-PCS | Performed by: INTERNAL MEDICINE

## 2019-04-07 NOTE — NUR
PT A/O. TELE TRACKING NSR AND ALL VSS ON ROOM AIR. DENIES CP, SOA. BLOOD
TRANSFUSED WITHOUT DIFFICULTY OR REACTION. EDUCATED ON SAFETY AND PLAN OF
CARE. PLEASE SEE ASSESSMENT FOR ADDITIONAL INFORMATION. WILL CONTINUE TO
MONITOR

## 2019-04-07 NOTE — NUR
RECIEVED REPORT AND ASSUMED CARE AT 1900. CARDIAC MONITOR IN PLACE. VITAL
SIGNS ARE STABLE. PT IS UP WITH SBA. SHE HAS PAIN IN RIGHT KNEE AND PAIN MEDS
GIVEN AS ORDERED. ASSESSMENT COMPLETED AND DISCUSSED PLAN OF CARE PT
UNDERSTANDS. BED LOCKED, ALARM ON AND CALL LIGHT WITHIN REACH. FALL
PRECAUTIONS IN PLACE. HOURLY ROUNDING DONE AND ALL NEEDS MET. NURSING WILL
CONTINUE TO MONITOR.

## 2019-04-08 VITALS — DIASTOLIC BLOOD PRESSURE: 67 MMHG | SYSTOLIC BLOOD PRESSURE: 132 MMHG

## 2019-04-08 VITALS — SYSTOLIC BLOOD PRESSURE: 132 MMHG | DIASTOLIC BLOOD PRESSURE: 67 MMHG

## 2019-04-08 VITALS — DIASTOLIC BLOOD PRESSURE: 70 MMHG | SYSTOLIC BLOOD PRESSURE: 145 MMHG

## 2019-04-08 VITALS — SYSTOLIC BLOOD PRESSURE: 120 MMHG | DIASTOLIC BLOOD PRESSURE: 60 MMHG

## 2019-04-08 VITALS — DIASTOLIC BLOOD PRESSURE: 69 MMHG | SYSTOLIC BLOOD PRESSURE: 140 MMHG

## 2019-04-08 LAB
ANION GAP SERPL CALC-SCNC: 12 MMOL/L (ref 7–16)
BUN SERPL-MCNC: 29 MG/DL (ref 7–18)
CALCIUM SERPL-MCNC: 8.2 MG/DL (ref 8.5–10.1)
CHLORIDE SERPL-SCNC: 110 MMOL/L (ref 98–107)
CO2 SERPL-SCNC: 20 MMOL/L (ref 21–32)
CREAT SERPL-MCNC: 1.3 MG/DL (ref 0.6–1.3)
GLUCOSE SERPL-MCNC: 103 MG/DL (ref 70–99)
HCT VFR BLD CALC: 26.8 % (ref 37–47)
HGB BLD-MCNC: 8.5 GM/DL (ref 12–15)
MCH RBC QN AUTO: 24.7 PG (ref 26–34)
MCHC RBC AUTO-ENTMCNC: 31.8 G/DL (ref 28–37)
MCV RBC: 77.6 FL (ref 80–100)
MPV: 7.6 FL. (ref 7.2–11.1)
PLATELET COUNT*: 352 THOU/UL (ref 150–400)
POTASSIUM SERPL-SCNC: 3.8 MMOL/L (ref 3.5–5.1)
RBC # BLD AUTO: 3.45 MIL/UL (ref 4.2–5)
RDW-CV: 18.8 % (ref 10.5–14.5)
SODIUM SERPL-SCNC: 142 MMOL/L (ref 136–145)
WBC # BLD AUTO: 14 THOU/UL (ref 4–11)

## 2019-04-08 NOTE — NUR
Pt discharging to home today, plans to move to [facility] tomorrow. CM
faxed HH orders to [facility].

## 2019-04-08 NOTE — NUR
DISCHARGE PLANNER SPOKE TO THE PATIENT TO DISCUSS DISCHARGE PLANNING AND
SKILLED AT D/C. PATIENT CONTINUES TO DECLINE SKILLED DESPITE EDUCATION. CM
WILL REMAIN AVIALABLE TO ASSIST AND FOLLOW AS NEEDED.

## 2019-04-08 NOTE — NUR
PT A/O. TELE TRACKING NSR AND ALL VSS ON ROOM AIR. DENIES CP, SOA. PT LOOKING
FORWARD TO DC LATER TODAY. EDUCATED ON SAFETY AND PLAN OF CARE. PLEASE SEE
ASSESSMENT FOR ADDITIONAL INFORMATION. WILL CONTINUE TO MONITOR

## 2019-04-08 NOTE — NUR
RECIEVED REPORT AND ASSUMED CARE AT 1900. CARDIAC MONITOR IN PLACE. VITAL
SIGNS STABLE. PT IS UP WITH SBA TO COMMODE. SHE HAS SOME PAIN FROM TIME TO
TIME IN RIGHT KNEE D/T PREVIOUS SURGERY AND HAS ROUTINE TYLENOL THAT HELPS
MANAGE PAIN. ASSESSMENT COMPLETED, DISCUSSED PLAN OF CARE AND PT UNDERSTANDS.
BED LOCKED, ALARM ON AND CALL LIGHT WITHIN REACH. FALL PRECAUTIONS IN PLACE.
HOURLY ROUNDING DONE AND ALL NEEDS MET. NURSING WILL CONTINUE TO MONITOR.
